# Patient Record
Sex: FEMALE | Race: WHITE | Employment: FULL TIME | URBAN - METROPOLITAN AREA
[De-identification: names, ages, dates, MRNs, and addresses within clinical notes are randomized per-mention and may not be internally consistent; named-entity substitution may affect disease eponyms.]

---

## 2017-10-27 ENCOUNTER — TELEPHONE (OUTPATIENT)
Dept: INTERNAL MEDICINE CLINIC | Facility: CLINIC | Age: 47
End: 2017-10-27

## 2017-10-27 NOTE — TELEPHONE ENCOUNTER
Dr Hero Nunes office called to request a copy of last labs and last ov.      Informed last ov was on 6/3/16  And last labs completed on 8/15/14    Faxed to [phone]

## 2017-11-29 ENCOUNTER — TELEPHONE (OUTPATIENT)
Dept: INTERNAL MEDICINE CLINIC | Facility: CLINIC | Age: 47
End: 2017-11-29

## 2017-11-29 NOTE — TELEPHONE ENCOUNTER
Left detailed message LOV 6/3/16.  Is she still Ramandeep's patient if not who is PCP so we may forward office notes from Sharif Hoang to her current PCP

## 2017-12-06 NOTE — TELEPHONE ENCOUNTER
Patient has never responded.  Will inform Felicia  (clinical supv ) to remove name from Dr. Enmanuel May patient list

## 2018-01-17 ENCOUNTER — TELEPHONE (OUTPATIENT)
Dept: INTERNAL MEDICINE CLINIC | Facility: CLINIC | Age: 48
End: 2018-01-17

## 2018-01-17 ENCOUNTER — OFFICE VISIT (OUTPATIENT)
Dept: INTERNAL MEDICINE CLINIC | Facility: CLINIC | Age: 48
End: 2018-01-17

## 2018-01-17 VITALS
DIASTOLIC BLOOD PRESSURE: 78 MMHG | BODY MASS INDEX: 20.73 KG/M2 | HEIGHT: 66 IN | RESPIRATION RATE: 18 BRPM | HEART RATE: 96 BPM | WEIGHT: 129 LBS | SYSTOLIC BLOOD PRESSURE: 100 MMHG | TEMPERATURE: 99 F | OXYGEN SATURATION: 98 %

## 2018-01-17 DIAGNOSIS — R50.9 FEVER, UNSPECIFIED FEVER CAUSE: Primary | ICD-10-CM

## 2018-01-17 DIAGNOSIS — M79.10 MYALGIA: ICD-10-CM

## 2018-01-17 DIAGNOSIS — R05.9 COUGH: ICD-10-CM

## 2018-01-17 DIAGNOSIS — J02.9 PHARYNGITIS, UNSPECIFIED ETIOLOGY: ICD-10-CM

## 2018-01-17 LAB
CONTROL LINE PRESENT WITH A CLEAR BACKGROUND (YES/NO): YES YES/NO
KIT LOT #: NORMAL NUMERIC
STREP GRP A CUL-SCR: NEGATIVE

## 2018-01-17 PROCEDURE — 87798 DETECT AGENT NOS DNA AMP: CPT | Performed by: PHYSICIAN ASSISTANT

## 2018-01-17 PROCEDURE — 87880 STREP A ASSAY W/OPTIC: CPT | Performed by: PHYSICIAN ASSISTANT

## 2018-01-17 PROCEDURE — 99214 OFFICE O/P EST MOD 30 MIN: CPT | Performed by: PHYSICIAN ASSISTANT

## 2018-01-17 PROCEDURE — 87502 INFLUENZA DNA AMP PROBE: CPT | Performed by: PHYSICIAN ASSISTANT

## 2018-01-17 RX ORDER — OSELTAMIVIR PHOSPHATE 75 MG/1
75 CAPSULE ORAL 2 TIMES DAILY
Qty: 10 CAPSULE | Refills: 0 | Status: SHIPPED | OUTPATIENT
Start: 2018-01-17 | End: 2018-01-22

## 2018-01-17 NOTE — PROGRESS NOTES
HPI:  Chely Narayanan is a 49year old female who presents for flu like symptoms x 3 days. C/o fever (up to 102.7), chills, headache, sore throat, body aches, cough, R ear pain.   Denies chest tightness, SOB, nasal congestion, sinus pressure, or GI sympt nourished, in no acute distress  SKIN: no rashes, no suspicious lesions  EYES: EOMI, conjunctiva are clear  HEENT: normocephalic, atraumatic, TMs clear & flat bilaterally, oropharynx erythematous but no exudate, nasal mucosa inflamed  NECK: supple, no lymp

## 2020-01-31 ENCOUNTER — HOSPITAL ENCOUNTER (OUTPATIENT)
Age: 50
Discharge: HOME | End: 2020-01-31
Payer: COMMERCIAL

## 2020-01-31 VITALS
WEIGHT: 120 LBS | HEART RATE: 82 BPM | BODY MASS INDEX: 19.29 KG/M2 | DIASTOLIC BLOOD PRESSURE: 74 MMHG | HEIGHT: 66 IN | OXYGEN SATURATION: 100 % | SYSTOLIC BLOOD PRESSURE: 135 MMHG | RESPIRATION RATE: 18 BRPM | TEMPERATURE: 98 F

## 2020-01-31 DIAGNOSIS — N39.0 URINARY TRACT INFECTION WITH HEMATURIA, SITE UNSPECIFIED: Primary | ICD-10-CM

## 2020-01-31 DIAGNOSIS — R31.9 URINARY TRACT INFECTION WITH HEMATURIA, SITE UNSPECIFIED: Primary | ICD-10-CM

## 2020-01-31 LAB
POCT BILIRUBIN URINE: NEGATIVE
POCT GLUCOSE URINE: NEGATIVE MG/DL
POCT KETONE URINE: NEGATIVE MG/DL
POCT NITRITE URINE: NEGATIVE
POCT PH URINE: 6 (ref 5–8)
POCT SPECIFIC GRAVITY URINE: 1.03
POCT URINE CLARITY: CLEAR
POCT URINE COLOR: YELLOW
POCT UROBILINOGEN URINE: 1 MG/DL

## 2020-01-31 PROCEDURE — 87086 URINE CULTURE/COLONY COUNT: CPT | Performed by: PHYSICIAN ASSISTANT

## 2020-01-31 PROCEDURE — 87186 SC STD MICRODIL/AGAR DIL: CPT | Performed by: PHYSICIAN ASSISTANT

## 2020-01-31 PROCEDURE — 87077 CULTURE AEROBIC IDENTIFY: CPT | Performed by: PHYSICIAN ASSISTANT

## 2020-01-31 PROCEDURE — 99214 OFFICE O/P EST MOD 30 MIN: CPT

## 2020-01-31 PROCEDURE — 81002 URINALYSIS NONAUTO W/O SCOPE: CPT | Performed by: PHYSICIAN ASSISTANT

## 2020-01-31 PROCEDURE — 99204 OFFICE O/P NEW MOD 45 MIN: CPT

## 2020-01-31 RX ORDER — NITROFURANTOIN 25; 75 MG/1; MG/1
100 CAPSULE ORAL 2 TIMES DAILY
Qty: 10 CAPSULE | Refills: 0 | Status: SHIPPED | OUTPATIENT
Start: 2020-01-31 | End: 2020-02-05

## 2020-01-31 NOTE — ED PROVIDER NOTES
Patient Seen in: [facility]      History   Patient presents with:  Urinary Symptoms    Stated Complaint: uti symptoms today    HPI    59-year-old female here with complaint of UTI symptoms that started today.   Patient reports dy Constitutional:       Appearance: She is well-developed. HENT:      Head: Normocephalic.       Right Ear: Tympanic membrane and external ear normal.      Left Ear: Tympanic membrane and external ear normal.      Nose: Nose normal.      Mouth/Throat: physician. The patient is in good condition thru out treatment today and remains so upon discharge. I discussed the plan of care with the patient, who expresses understanding.  All questions and concerns are addressed to the patients satisfaction dhiraj

## 2022-04-27 ENCOUNTER — OFFICE VISIT (OUTPATIENT)
Dept: INTERNAL MEDICINE CLINIC | Facility: CLINIC | Age: 52
End: 2022-04-27
Payer: COMMERCIAL

## 2022-04-27 VITALS
OXYGEN SATURATION: 100 % | HEART RATE: 78 BPM | SYSTOLIC BLOOD PRESSURE: 130 MMHG | HEIGHT: 66 IN | DIASTOLIC BLOOD PRESSURE: 89 MMHG | BODY MASS INDEX: 18.48 KG/M2 | TEMPERATURE: 98 F | WEIGHT: 115 LBS

## 2022-04-27 DIAGNOSIS — Z12.31 ENCOUNTER FOR SCREENING MAMMOGRAM FOR MALIGNANT NEOPLASM OF BREAST: ICD-10-CM

## 2022-04-27 DIAGNOSIS — Z00.00 ANNUAL PHYSICAL EXAM: Primary | ICD-10-CM

## 2022-04-27 DIAGNOSIS — N89.8 VAGINAL IRRITATION: ICD-10-CM

## 2022-04-27 DIAGNOSIS — Z23 IMMUNIZATION DUE: ICD-10-CM

## 2022-04-27 DIAGNOSIS — Z12.4 CERVICAL CANCER SCREENING: ICD-10-CM

## 2022-04-27 PROCEDURE — 90750 HZV VACC RECOMBINANT IM: CPT | Performed by: INTERNAL MEDICINE

## 2022-04-27 PROCEDURE — 87510 GARDNER VAG DNA DIR PROBE: CPT | Performed by: INTERNAL MEDICINE

## 2022-04-27 PROCEDURE — 3075F SYST BP GE 130 - 139MM HG: CPT | Performed by: INTERNAL MEDICINE

## 2022-04-27 PROCEDURE — 87491 CHLMYD TRACH DNA AMP PROBE: CPT | Performed by: INTERNAL MEDICINE

## 2022-04-27 PROCEDURE — 87591 N.GONORRHOEAE DNA AMP PROB: CPT | Performed by: INTERNAL MEDICINE

## 2022-04-27 PROCEDURE — 99386 PREV VISIT NEW AGE 40-64: CPT | Performed by: INTERNAL MEDICINE

## 2022-04-27 PROCEDURE — 3079F DIAST BP 80-89 MM HG: CPT | Performed by: INTERNAL MEDICINE

## 2022-04-27 PROCEDURE — 90471 IMMUNIZATION ADMIN: CPT | Performed by: INTERNAL MEDICINE

## 2022-04-27 PROCEDURE — 3008F BODY MASS INDEX DOCD: CPT | Performed by: INTERNAL MEDICINE

## 2022-04-27 PROCEDURE — 87480 CANDIDA DNA DIR PROBE: CPT | Performed by: INTERNAL MEDICINE

## 2022-04-27 PROCEDURE — 87660 TRICHOMONAS VAGIN DIR PROBE: CPT | Performed by: INTERNAL MEDICINE

## 2022-04-27 RX ORDER — OMEGA-3S/DHA/EPA/FISH OIL/D3 1150-1000
LIQUID (ML) ORAL DAILY
COMMUNITY

## 2022-04-27 RX ORDER — CHOLECALCIFEROL (VITAMIN D3) 1250 MCG
CAPSULE ORAL
COMMUNITY

## 2022-04-28 LAB
C TRACH DNA SPEC QL NAA+PROBE: NEGATIVE
N GONORRHOEA DNA SPEC QL NAA+PROBE: NEGATIVE

## 2022-05-16 LAB — HPV I/H RISK 1 DNA SPEC QL NAA+PROBE: NEGATIVE

## 2022-06-10 ENCOUNTER — HOSPITAL ENCOUNTER (OUTPATIENT)
Age: 52
Discharge: HOME | End: 2022-06-10
Attending: EMERGENCY MEDICINE
Payer: COMMERCIAL

## 2022-06-10 VITALS
HEIGHT: 66 IN | WEIGHT: 113 LBS | BODY MASS INDEX: 18.16 KG/M2 | RESPIRATION RATE: 16 BRPM | HEART RATE: 67 BPM | SYSTOLIC BLOOD PRESSURE: 147 MMHG | TEMPERATURE: 98 F | OXYGEN SATURATION: 98 % | DIASTOLIC BLOOD PRESSURE: 73 MMHG

## 2022-06-10 DIAGNOSIS — N30.01 ACUTE CYSTITIS WITH HEMATURIA: Primary | ICD-10-CM

## 2022-06-10 LAB
POCT BILIRUBIN URINE: NEGATIVE
POCT GLUCOSE URINE: NEGATIVE MG/DL
POCT KETONE URINE: NEGATIVE MG/DL
POCT NITRITE URINE: NEGATIVE
POCT PH URINE: 7 (ref 5–8)
POCT PROTEIN URINE: NEGATIVE MG/DL
POCT SPECIFIC GRAVITY URINE: 1.01
POCT URINE COLOR: YELLOW
POCT UROBILINOGEN URINE: 0.2 MG/DL

## 2022-06-10 PROCEDURE — 87186 SC STD MICRODIL/AGAR DIL: CPT | Performed by: EMERGENCY MEDICINE

## 2022-06-10 PROCEDURE — 87086 URINE CULTURE/COLONY COUNT: CPT | Performed by: EMERGENCY MEDICINE

## 2022-06-10 PROCEDURE — 87088 URINE BACTERIA CULTURE: CPT | Performed by: EMERGENCY MEDICINE

## 2022-06-10 PROCEDURE — 99214 OFFICE O/P EST MOD 30 MIN: CPT

## 2022-06-10 PROCEDURE — 81002 URINALYSIS NONAUTO W/O SCOPE: CPT | Performed by: EMERGENCY MEDICINE

## 2022-06-10 RX ORDER — SULFAMETHOXAZOLE AND TRIMETHOPRIM 800; 160 MG/1; MG/1
1 TABLET ORAL 2 TIMES DAILY
Qty: 6 TABLET | Refills: 0 | Status: SHIPPED | OUTPATIENT
Start: 2022-06-10 | End: 2022-06-13

## 2022-06-10 NOTE — ED INITIAL ASSESSMENT (HPI)
Pt with dysuria, cloudy urine x 3 days; L flank pain today; a little hematuria yesterday    No fever/vom

## 2023-06-07 ENCOUNTER — HOSPITAL ENCOUNTER (OUTPATIENT)
Age: 53
Discharge: HOME | End: 2023-06-07
Payer: COMMERCIAL

## 2023-06-07 VITALS
HEART RATE: 80 BPM | TEMPERATURE: 98 F | BODY MASS INDEX: 19 KG/M2 | RESPIRATION RATE: 20 BRPM | WEIGHT: 115 LBS | SYSTOLIC BLOOD PRESSURE: 152 MMHG | DIASTOLIC BLOOD PRESSURE: 87 MMHG | OXYGEN SATURATION: 100 %

## 2023-06-07 DIAGNOSIS — R82.71 BACTERIA IN URINE: Primary | ICD-10-CM

## 2023-06-07 DIAGNOSIS — M54.50 BACK PAIN, LUMBOSACRAL: ICD-10-CM

## 2023-06-07 LAB
B-HCG UR QL: NEGATIVE
POCT BILIRUBIN URINE: NEGATIVE
POCT GLUCOSE URINE: NEGATIVE MG/DL
POCT KETONE URINE: NEGATIVE MG/DL
POCT NITRITE URINE: NEGATIVE
POCT PH URINE: 6 (ref 5–8)
POCT PROTEIN URINE: 30 MG/DL
POCT SPECIFIC GRAVITY URINE: 1.01
POCT URINE CLARITY: CLEAR
POCT URINE COLOR: YELLOW
POCT UROBILINOGEN URINE: 0.2 MG/DL

## 2023-06-07 PROCEDURE — 87088 URINE BACTERIA CULTURE: CPT | Performed by: NURSE PRACTITIONER

## 2023-06-07 PROCEDURE — 81002 URINALYSIS NONAUTO W/O SCOPE: CPT | Performed by: NURSE PRACTITIONER

## 2023-06-07 PROCEDURE — 87186 SC STD MICRODIL/AGAR DIL: CPT | Performed by: NURSE PRACTITIONER

## 2023-06-07 PROCEDURE — 99214 OFFICE O/P EST MOD 30 MIN: CPT

## 2023-06-07 PROCEDURE — 87086 URINE CULTURE/COLONY COUNT: CPT | Performed by: NURSE PRACTITIONER

## 2023-06-07 PROCEDURE — 81025 URINE PREGNANCY TEST: CPT

## 2023-06-07 RX ORDER — PHENAZOPYRIDINE HYDROCHLORIDE 200 MG/1
200 TABLET, FILM COATED ORAL 3 TIMES DAILY PRN
Qty: 6 TABLET | Refills: 0 | Status: SHIPPED | OUTPATIENT
Start: 2023-06-07 | End: 2023-06-14

## 2023-06-07 RX ORDER — SULFAMETHOXAZOLE AND TRIMETHOPRIM 800; 160 MG/1; MG/1
1 TABLET ORAL 2 TIMES DAILY
Qty: 10 TABLET | Refills: 0 | Status: SHIPPED | OUTPATIENT
Start: 2023-06-07 | End: 2023-06-12

## 2023-06-07 NOTE — DISCHARGE INSTRUCTIONS
Take antibiotic as directed. For female patients: Whenever taking antibiotics, hormonal contraceptive medications may be less effective; you should therefore use barrier protection or some other form of secondary contraception while taking the antibiotic and for at least one week after the antibiotic is finished. Pyridium / Phenazopyridine (bladder anti-spasmotic) for bladder spasm. This will turn your urine bright orange. (This medicine may also stain contact lens). Drink enough water and fluids to keep your urine clear or pale yellow. Avoid caffeine, tea, and carbonated beverages. They tend to irritate your bladder. Empty your bladder often. Avoid holding urine for long periods of time. Empty your bladder before and after sexual intercourse. Women should cleanse from front to back. Use each tissue only once. SEEK MEDICAL CARE IF:   You have back pain or abdominal pain   You develop a fever/ chills, nausea, vomiting   Your symptoms do not begin to resolve within a few days   We have sent a culture of your urine and will call you with the results          HOME CARE INSTRUCTIONS Back pain  Back pain normally improves slowly over a 2-4 week period if no re injury occurs. No strenuous work or lifting until symptoms resolve. Take 200mg of Ibuprofen with 500mg of Tylenol (Acetaminophen) every 4-6 hours as needed for pain  You may take up to a maximum of 600mg or Ibuprofen along with 2 extra-strength Tylenol (Acetaminophen) every 6-8 hours as needed for pain. Application of heat or ice to the area is also helpful. Over the counter preparations such as  [employer], Biofreeze, Capsaisin cream  can be beneficial.  Soaking in water bath water with Epsom salts may be beneficial.       Gentle stretching/ strengthening of Core muscle groups is also recommended in the future. Remain active. Do not stay in bed. Resting more than 1 or 2 days can delay your recovery. It is stressful on the back to sit or  one place. Do not sit, drive, or  one place for prolonged periods of time. .   Use good body mechanics (bend with knees/ not at waist). Hold heavy loads close to your body. Put cold packs on the injured area. Leave the cold packs on for 15-20 minutes, 3-4 times a day for the first 2 to 3 days. After that, ice and heat may be alternated to reduce pain and spasms. Red Flags of back pain  SEEK MEDICAL CARE IF:   You have pain that is not relieved with rest or medicine  You develop new bowel or bladder control problems.    You have unusual weakness or numbness in your arms or legs, or genital area

## 2023-06-07 NOTE — ED INITIAL ASSESSMENT (HPI)
C/o urinary symptoms for 1 week. Pt reports cloudy urine, fever, left side back pain. Pt denies otc meds for symptoms.